# Patient Record
Sex: MALE | Race: BLACK OR AFRICAN AMERICAN | NOT HISPANIC OR LATINO | Employment: OTHER | URBAN - METROPOLITAN AREA
[De-identification: names, ages, dates, MRNs, and addresses within clinical notes are randomized per-mention and may not be internally consistent; named-entity substitution may affect disease eponyms.]

---

## 2023-01-06 ENCOUNTER — APPOINTMENT (EMERGENCY)
Dept: RADIOLOGY | Facility: HOSPITAL | Age: 43
End: 2023-01-06

## 2023-01-06 ENCOUNTER — APPOINTMENT (EMERGENCY)
Dept: CT IMAGING | Facility: HOSPITAL | Age: 43
End: 2023-01-06

## 2023-01-06 ENCOUNTER — HOSPITAL ENCOUNTER (EMERGENCY)
Facility: HOSPITAL | Age: 43
Discharge: HOME | End: 2023-01-06
Attending: EMERGENCY MEDICINE

## 2023-01-06 VITALS
RESPIRATION RATE: 20 BRPM | OXYGEN SATURATION: 100 % | SYSTOLIC BLOOD PRESSURE: 143 MMHG | TEMPERATURE: 97.4 F | DIASTOLIC BLOOD PRESSURE: 69 MMHG | HEIGHT: 73 IN | WEIGHT: 185 LBS | BODY MASS INDEX: 24.52 KG/M2 | HEART RATE: 64 BPM

## 2023-01-06 DIAGNOSIS — S13.9XXA ACUTE SPRAIN OF LIGAMENT OF NECK, INITIAL ENCOUNTER: ICD-10-CM

## 2023-01-06 DIAGNOSIS — M54.50 LOW BACK PAIN: ICD-10-CM
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
## 2023-01-06 RX ORDER — IBUPROFEN 400 MG/1
400 TABLET ORAL ONCE
Status: COMPLETED | OUTPATIENT
Start: 2023-01-06 | End: 2023-01-06

## 2023-01-06 RX ORDER — ACETAMINOPHEN 325 MG/1
650 TABLET ORAL ONCE
Status: COMPLETED | OUTPATIENT
Start: 2023-01-06 | End: 2023-01-06

## 2023-01-06 RX ORDER — OXYCODONE HYDROCHLORIDE 5 MG/1
5 TABLET ORAL EVERY 6 HOURS PRN
Qty: 10 TABLET | Refills: 0 | Status: SHIPPED | OUTPATIENT
Start: 2023-01-06 | End: 2023-01-16

## 2023-01-06 RX ORDER — METHOCARBAMOL 500 MG/1
500 TABLET, FILM COATED ORAL 2 TIMES DAILY
Qty: 20 TABLET | Refills: 0 | Status: SHIPPED | OUTPATIENT
Start: 2023-01-06

## 2023-01-06 RX ADMIN — IBUPROFEN 400 MG: 400 TABLET ORAL at 15:56

## 2023-01-06 RX ADMIN — ACETAMINOPHEN 650 MG: 325 TABLET, FILM COATED ORAL at 15:56
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
## 2023-01-06 NOTE — Clinical Note
Heath Willoughby was seen and treated in our emergency department on 1/6/2023  Diagnosis:     Ashley Tate  may return to work on return date  He may return on this date: 01/09/2023         If you have any questions or concerns, please don't hesitate to call        Troy Card PA-C    ______________________________           _______________          _______________  Hospital Representative                              Date                                Time

## 2023-01-06 NOTE — Clinical Note
Claudean Croak was seen and treated in our emergency department on 1/6/2023  Diagnosis:     Bethany Aranda  may return to work on return date  He may return on this date: 01/09/2023         If you have any questions or concerns, please don't hesitate to call        Teto Patel PA-C    ______________________________           _______________          _______________  Hospital Representative                              Date                                Time

## 2023-03-29 ENCOUNTER — HOSPITAL ENCOUNTER (OUTPATIENT)
Dept: MRI IMAGING | Facility: HOSPITAL | Age: 43
Discharge: HOME | End: 2023-03-29

## 2023-03-29 DIAGNOSIS — S13.9XXA NECK SPRAIN, INITIAL ENCOUNTER: ICD-10-CM
